# Patient Record
Sex: MALE | URBAN - METROPOLITAN AREA
[De-identification: names, ages, dates, MRNs, and addresses within clinical notes are randomized per-mention and may not be internally consistent; named-entity substitution may affect disease eponyms.]

---

## 2022-10-04 ENCOUNTER — OFFICE (OUTPATIENT)
Dept: URBAN - METROPOLITAN AREA CLINIC 105 | Facility: CLINIC | Age: 27
Setting detail: OPHTHALMOLOGY
End: 2022-10-04
Payer: COMMERCIAL

## 2022-10-04 DIAGNOSIS — H40.013: ICD-10-CM

## 2022-10-04 DIAGNOSIS — H52.31: ICD-10-CM

## 2022-10-04 PROCEDURE — 92015 DETERMINE REFRACTIVE STATE: CPT | Performed by: OPTOMETRIST

## 2022-10-04 PROCEDURE — 92004 COMPRE OPH EXAM NEW PT 1/>: CPT | Performed by: OPTOMETRIST

## 2022-10-04 ASSESSMENT — KERATOMETRY
OS_K2POWER_DIOPTERS: 40.00
OS_K1POWER_DIOPTERS: 39.25
OD_K2POWER_DIOPTERS: 40.50
OS_AXISANGLE_DEGREES: 065
OD_AXISANGLE_DEGREES: 085
OD_K1POWER_DIOPTERS: 39.00

## 2022-10-04 ASSESSMENT — TONOMETRY
OS_IOP_MMHG: 19
OD_IOP_MMHG: 19

## 2022-10-04 ASSESSMENT — REFRACTION_MANIFEST
OS_VA1: 20/20
OD_VA1: 20/20
OD_SPHERE: +1.75
OS_VA1: 20/20
OD_CYLINDER: -1.25
OD_CYLINDER: -1.25
OS_SPHERE: -0.25
OD_AXIS: 160
OS_SPHERE: PLANO
OD_AXIS: 160
OD_VA1: 20/20
OD_SPHERE: +1.75
OS_CYLINDER: SPH
OS_CYLINDER: SPH

## 2022-10-04 ASSESSMENT — AXIALLENGTH_DERIVED
OD_AL: 24.5699
OD_AL: 24.5699
OD_AL: 24.7831
OS_AL: 25.2732

## 2022-10-04 ASSESSMENT — REFRACTION_AUTOREFRACTION
OD_SPHERE: +1.00
OD_CYLINDER: -0.75
OD_AXIS: 157
OS_CYLINDER: -0.25
OS_SPHERE: -0.25
OS_AXIS: 086

## 2022-10-04 ASSESSMENT — VISUAL ACUITY
OS_BCVA: 20/30+2
OD_BCVA: 20/20

## 2022-10-04 ASSESSMENT — SPHEQUIV_DERIVED
OS_SPHEQUIV: -0.375
OD_SPHEQUIV: 1.125
OD_SPHEQUIV: 0.625
OD_SPHEQUIV: 1.125

## 2022-10-04 ASSESSMENT — CONFRONTATIONAL VISUAL FIELD TEST (CVF)
OS_FINDINGS: FULL
OD_FINDINGS: FULL

## 2022-10-25 ENCOUNTER — OFFICE (OUTPATIENT)
Dept: URBAN - METROPOLITAN AREA CLINIC 105 | Facility: CLINIC | Age: 27
Setting detail: OPHTHALMOLOGY
End: 2022-10-25
Payer: COMMERCIAL

## 2022-10-25 DIAGNOSIS — H53.32: ICD-10-CM

## 2022-10-25 PROCEDURE — 99213 OFFICE O/P EST LOW 20 MIN: CPT | Performed by: OPTOMETRIST

## 2022-10-25 ASSESSMENT — AXIALLENGTH_DERIVED
OD_AL: 24.5699
OS_AL: 25.2732
OD_AL: 24.7831
OD_AL: 24.5699

## 2022-10-25 ASSESSMENT — REFRACTION_CURRENTRX
OD_CYLINDER: -1.25
OD_SPHERE: +1.75
OD_OVR_VA: 20/
OS_CYLINDER: SPH
OS_SPHERE: PLANO
OD_AXIS: 160
OS_OVR_VA: 20/

## 2022-10-25 ASSESSMENT — REFRACTION_MANIFEST
OD_AXIS: 160
OD_CYLINDER: -1.25
OD_SPHERE: +1.75
OD_CYLINDER: -1.25
OD_SPHERE: +1.75
OS_CYLINDER: SPH
OS_VA1: 20/20
OD_VA1: 20/20
OS_CYLINDER: SPH
OD_AXIS: 160
OS_VA1: 20/20
OD_VA1: 20/20
OS_SPHERE: PLANO
OS_SPHERE: -0.25

## 2022-10-25 ASSESSMENT — VISUAL ACUITY
OS_BCVA: 20/20-2
OD_BCVA: 20/20

## 2022-10-25 ASSESSMENT — CONFRONTATIONAL VISUAL FIELD TEST (CVF)
OD_FINDINGS: FULL
OS_FINDINGS: FULL

## 2022-10-25 ASSESSMENT — REFRACTION_AUTOREFRACTION
OS_SPHERE: -0.25
OS_AXIS: 086
OS_CYLINDER: -0.25
OD_AXIS: 157
OD_SPHERE: +1.00
OD_CYLINDER: -0.75

## 2022-10-25 ASSESSMENT — SPHEQUIV_DERIVED
OD_SPHEQUIV: 1.125
OS_SPHEQUIV: -0.375
OD_SPHEQUIV: 0.625
OD_SPHEQUIV: 1.125

## 2022-10-25 ASSESSMENT — KERATOMETRY
OS_K2POWER_DIOPTERS: 40.00
OD_K2POWER_DIOPTERS: 40.50
OS_AXISANGLE_DEGREES: 065
OD_AXISANGLE_DEGREES: 085
OS_K1POWER_DIOPTERS: 39.25
OD_K1POWER_DIOPTERS: 39.00

## 2022-11-29 ENCOUNTER — OFFICE (OUTPATIENT)
Dept: URBAN - METROPOLITAN AREA CLINIC 113 | Facility: CLINIC | Age: 27
Setting detail: OPHTHALMOLOGY
End: 2022-11-29
Payer: COMMERCIAL

## 2022-11-29 DIAGNOSIS — H52.31: ICD-10-CM

## 2022-11-29 PROCEDURE — 92310 CONTACT LENS FITTING OU: CPT | Performed by: OPTOMETRIST

## 2022-11-29 ASSESSMENT — REFRACTION_AUTOREFRACTION
OS_SPHERE: -0.25
OD_AXIS: 161
OD_SPHERE: +1.25
OD_CYLINDER: -1.00
OS_CYLINDER: -0.25
OS_AXIS: 085

## 2022-11-29 ASSESSMENT — REFRACTION_MANIFEST
OD_AXIS: 160
OD_VA1: 20/20
OS_VA1: 20/20
OS_SPHERE: -0.25
OS_CYLINDER: SPH
OD_AXIS: 160
OD_SPHERE: +1.75
OS_VA1: 20/20
OD_CYLINDER: -1.25
OD_VA1: 20/20
OS_CYLINDER: SPH
OD_SPHERE: +1.75
OS_SPHERE: PLANO
OD_CYLINDER: -1.25

## 2022-11-29 ASSESSMENT — AXIALLENGTH_DERIVED
OS_AL: 25.1682
OD_AL: 24.78
OD_AL: 24.6198
OD_AL: 24.6198

## 2022-11-29 ASSESSMENT — SPHEQUIV_DERIVED
OD_SPHEQUIV: 1.125
OS_SPHEQUIV: -0.375
OD_SPHEQUIV: 0.75
OD_SPHEQUIV: 1.125

## 2022-11-29 ASSESSMENT — CONFRONTATIONAL VISUAL FIELD TEST (CVF)
OD_FINDINGS: FULL
OS_FINDINGS: FULL

## 2022-11-29 ASSESSMENT — REFRACTION_CURRENTRX
OD_CYLINDER: -1.25
OS_CYLINDER: SPH
OD_OVR_VA: 20/
OS_SPHERE: PLANO
OS_OVR_VA: 20/
OD_SPHERE: +1.75
OD_AXIS: 160

## 2022-11-29 ASSESSMENT — KERATOMETRY
OD_K2POWER_DIOPTERS: 40.25
OS_K1POWER_DIOPTERS: 39.50
OD_AXISANGLE_DEGREES: 084
OD_K1POWER_DIOPTERS: 39.00
OS_K2POWER_DIOPTERS: 40.25
OS_AXISANGLE_DEGREES: 080

## 2022-11-29 ASSESSMENT — VISUAL ACUITY
OS_BCVA: 20/25+1
OD_BCVA: 20/20

## 2022-11-29 ASSESSMENT — TONOMETRY
OS_IOP_MMHG: 17
OD_IOP_MMHG: 16

## 2022-12-06 ENCOUNTER — OFFICE (OUTPATIENT)
Dept: URBAN - METROPOLITAN AREA CLINIC 113 | Facility: CLINIC | Age: 27
Setting detail: OPHTHALMOLOGY
End: 2022-12-06
Payer: COMMERCIAL

## 2022-12-06 DIAGNOSIS — T15.11XD: ICD-10-CM

## 2022-12-06 DIAGNOSIS — T15.11XS: ICD-10-CM

## 2022-12-06 DIAGNOSIS — T15.12XD: ICD-10-CM

## 2022-12-06 DIAGNOSIS — T15.12XA: ICD-10-CM

## 2022-12-06 DIAGNOSIS — H40.013: ICD-10-CM

## 2022-12-06 DIAGNOSIS — T15.12XS: ICD-10-CM

## 2022-12-06 DIAGNOSIS — T15.11XA: ICD-10-CM

## 2022-12-06 PROBLEM — H53.32 FUSION WITH DEFECTIVE STEREOPSIS: Status: ACTIVE | Noted: 2022-10-25

## 2022-12-06 PROBLEM — H52.31 ANISOMETROPIA: Status: ACTIVE | Noted: 2022-10-04

## 2022-12-06 PROCEDURE — 99213 OFFICE O/P EST LOW 20 MIN: CPT | Performed by: STUDENT IN AN ORGANIZED HEALTH CARE EDUCATION/TRAINING PROGRAM

## 2022-12-06 ASSESSMENT — AXIALLENGTH_DERIVED
OD_AL: 24.5699
OS_AL: 24.7831
OD_AL: 24.5699
OD_AL: 24.3605

## 2022-12-06 ASSESSMENT — REFRACTION_MANIFEST
OD_AXIS: 160
OS_VA1: 20/20
OS_CYLINDER: SPH
OD_SPHERE: +1.75
OD_SPHERE: +1.75
OD_VA1: 20/20
OS_SPHERE: PLANO
OD_VA1: 20/20
OD_CYLINDER: -1.25
OD_CYLINDER: -1.25
OS_VA1: 20/20
OS_CYLINDER: SPH
OD_AXIS: 160
OS_SPHERE: -0.25

## 2022-12-06 ASSESSMENT — REFRACTION_CURRENTRX
OD_CYLINDER: -1.25
OS_CYLINDER: SPH
OS_OVR_VA: 20/
OD_SPHERE: +1.75
OD_AXIS: 160
OS_SPHERE: PLANO
OD_OVR_VA: 20/

## 2022-12-06 ASSESSMENT — CONFRONTATIONAL VISUAL FIELD TEST (CVF)
OD_FINDINGS: FULL
OS_FINDINGS: FULL

## 2022-12-06 ASSESSMENT — SPHEQUIV_DERIVED
OD_SPHEQUIV: 1.125
OD_SPHEQUIV: 1.625
OS_SPHEQUIV: 0.625
OD_SPHEQUIV: 1.125

## 2022-12-06 ASSESSMENT — REFRACTION_AUTOREFRACTION
OS_CYLINDER: -0.25
OD_CYLINDER: -0.75
OD_SPHERE: +2.00
OS_AXIS: 099
OS_SPHERE: +0.75
OD_AXIS: 161

## 2022-12-06 ASSESSMENT — KERATOMETRY
OS_K1POWER_DIOPTERS: 39.50
OD_K1POWER_DIOPTERS: 39.00
OS_K2POWER_DIOPTERS: 40.00
OD_K2POWER_DIOPTERS: 40.50
OS_AXISANGLE_DEGREES: 076
OD_AXISANGLE_DEGREES: 083

## 2022-12-06 ASSESSMENT — VISUAL ACUITY
OD_BCVA: 20/20
OS_BCVA: 20/25+1

## 2022-12-10 ENCOUNTER — OFFICE (OUTPATIENT)
Dept: URBAN - METROPOLITAN AREA CLINIC 12 | Facility: CLINIC | Age: 27
Setting detail: OPHTHALMOLOGY
End: 2022-12-10
Payer: COMMERCIAL

## 2022-12-10 DIAGNOSIS — H52.13: ICD-10-CM

## 2022-12-10 PROBLEM — T15.12XA FOREIGN BODY, CONJUNCTIVAL ; RIGHT EYE: Status: RESOLVED | Noted: 2022-12-06 | Resolved: 2022-12-10

## 2022-12-10 PROBLEM — T15.11XS FOREIGN BODY, CONJUNCTIVAL ; RIGHT EYE: Status: RESOLVED | Noted: 2022-12-06 | Resolved: 2022-12-10

## 2022-12-10 PROBLEM — T15.12XS FOREIGN BODY, CONJUNCTIVAL ; RIGHT EYE: Status: RESOLVED | Noted: 2022-12-06 | Resolved: 2022-12-10

## 2022-12-10 PROBLEM — T15.11XD FOREIGN BODY, CONJUNCTIVAL ; RIGHT EYE: Status: RESOLVED | Noted: 2022-12-06 | Resolved: 2022-12-10

## 2022-12-10 PROBLEM — T15.12XD FOREIGN BODY, CONJUNCTIVAL ; RIGHT EYE: Status: RESOLVED | Noted: 2022-12-06 | Resolved: 2022-12-10

## 2022-12-10 PROBLEM — T15.11XA FOREIGN BODY, CONJUNCTIVAL ; RIGHT EYE: Status: RESOLVED | Noted: 2022-12-06 | Resolved: 2022-12-10

## 2022-12-10 PROCEDURE — SCREF LASIK EVAL: Performed by: OPHTHALMOLOGY

## 2022-12-10 ASSESSMENT — REFRACTION_AUTOREFRACTION
OD_SPHERE: +1.75
OD_AXIS: 164
OD_CYLINDER: -0.75
OS_AXIS: 089
OS_CYLINDER: -0.25
OS_SPHERE: +0.75

## 2022-12-10 ASSESSMENT — REFRACTION_MANIFEST
OS_SPHERE: PLANO
OD_VA1: 20/20
OS_CYLINDER: -0.25
OD_CYLINDER: -0.75
OS_SPHERE: +0.75
OS_VA1: 20/20
OD_VA1: 20/25+1
OD_AXIS: 160
OD_CYLINDER: -1.25
OS_CYLINDER: SPH
OD_SPHERE: +1.75
OD_AXIS: 164
OD_SPHERE: +1.75
OS_AXIS: 089

## 2022-12-10 ASSESSMENT — AXIALLENGTH_DERIVED
OD_AL: 24.5699
OD_AL: 24.4647
OS_AL: 24.7831
OS_AL: 24.7831
OD_AL: 24.4647

## 2022-12-10 ASSESSMENT — KERATOMETRY
OD_K2POWER_DIOPTERS: 40.50
OD_AXISANGLE_DEGREES: 085
OS_K2POWER_DIOPTERS: 40.00
OS_K1POWER_DIOPTERS: 39.50
OD_K1POWER_DIOPTERS: 39.00
OS_AXISANGLE_DEGREES: 078

## 2022-12-10 ASSESSMENT — REFRACTION_CURRENTRX
OD_SPHERE: +1.75
OD_AXIS: 162
OD_CYLINDER: -1.25
OD_VPRISM_DIRECTION: SV
OD_OVR_VA: 20/
OS_OVR_VA: 20/
OS_SPHERE: PLANO
OS_VPRISM_DIRECTION: SV
OS_CYLINDER: SPH

## 2022-12-10 ASSESSMENT — VISUAL ACUITY
OS_BCVA: 20/25-2
OD_BCVA: 20/20

## 2022-12-10 ASSESSMENT — TONOMETRY
OS_IOP_MMHG: 20
OD_IOP_MMHG: 19

## 2022-12-10 ASSESSMENT — SPHEQUIV_DERIVED
OD_SPHEQUIV: 1.375
OS_SPHEQUIV: 0.625
OD_SPHEQUIV: 1.125
OD_SPHEQUIV: 1.375
OS_SPHEQUIV: 0.625

## 2022-12-10 ASSESSMENT — CONFRONTATIONAL VISUAL FIELD TEST (CVF)
OD_FINDINGS: FULL
OS_FINDINGS: FULL

## 2023-01-10 ENCOUNTER — APPOINTMENT (OUTPATIENT)
Dept: OPHTHALMOLOGY | Facility: CLINIC | Age: 28
End: 2023-01-10

## 2023-01-23 ENCOUNTER — OFFICE (OUTPATIENT)
Dept: URBAN - METROPOLITAN AREA CLINIC 12 | Facility: CLINIC | Age: 28
Setting detail: OPHTHALMOLOGY
End: 2023-01-23
Payer: COMMERCIAL

## 2023-01-23 DIAGNOSIS — Z20.822: ICD-10-CM

## 2023-01-23 DIAGNOSIS — H52.13: ICD-10-CM

## 2023-01-23 DIAGNOSIS — Z01.812: ICD-10-CM

## 2023-01-23 PROCEDURE — SCREF LASIK EVAL: Performed by: OPHTHALMOLOGY

## 2023-01-23 PROCEDURE — N/C NO CHARGE: Performed by: OPHTHALMOLOGY

## 2023-01-23 ASSESSMENT — REFRACTION_MANIFEST
OD_CYLINDER: -0.75
OS_SPHERE: +0.75
OD_VA1: 20/20
OD_AXIS: 164
OS_SPHERE: +0.75
OD_AXIS: 160
OS_CYLINDER: -0.25
OS_AXIS: 089
OD_VA1: 20/25+1
OS_SPHERE: PLANO
OD_SPHERE: +1.75
OD_AXIS: 164
OD_AXIS: 164
OD_VA1: 20/25+1
OD_VA1: 20/20-
OS_CYLINDER: SPH
OS_AXIS: 089
OD_SPHERE: +2.00
OS_CYLINDER: -0.25
OD_SPHERE: +1.75
OS_VA1: 20/20
OD_SPHERE: +1.75
OD_CYLINDER: -0.75
OD_CYLINDER: -0.75
OD_CYLINDER: -1.25

## 2023-01-23 ASSESSMENT — SPHEQUIV_DERIVED
OS_SPHEQUIV: 0.625
OS_SPHEQUIV: 0.625
OD_SPHEQUIV: 1.375
OS_SPHEQUIV: 0.625
OD_SPHEQUIV: 1.375
OD_SPHEQUIV: 1.125
OD_SPHEQUIV: 1.625
OD_SPHEQUIV: 1.375
OS_SPHEQUIV: 0.375
OD_SPHEQUIV: 1.375

## 2023-01-23 ASSESSMENT — KERATOMETRY
OS_K2POWER_DIOPTERS: 40.00
OD_K2POWER_DIOPTERS: 40.50
OD_K1POWER_DIOPTERS: 39.25
OD_AXISANGLE_DEGREES: 88
OS_K1POWER_DIOPTERS: 39.50
OS_AXISANGLE_DEGREES: 078
OS_K2POWER_DIOPTERS: 40.00
OS_AXISANGLE_DEGREES: 83
OD_AXISANGLE_DEGREES: 085
OS_K1POWER_DIOPTERS: 39.50
OD_K2POWER_DIOPTERS: 40.50
OD_K1POWER_DIOPTERS: 39.00

## 2023-01-23 ASSESSMENT — CONFRONTATIONAL VISUAL FIELD TEST (CVF)
OS_FINDINGS: FULL
OD_FINDINGS: FULL

## 2023-01-23 ASSESSMENT — REFRACTION_CURRENTRX
OS_AXIS: 0
OS_OVR_VA: 20/
OD_CYLINDER: -1.25
OS_VPRISM_DIRECTION: SV
OD_OVR_VA: 20/
OS_SPHERE: PLANO
OD_SPHERE: +1.75
OD_VPRISM_DIRECTION: SV
OD_CYLINDER: -1.25
OD_AXIS: 163
OD_AXIS: 162
OD_SPHERE: +1.75
OS_OVR_VA: 20/
OS_SPHERE: PLANO
OS_CYLINDER: SPH
OD_OVR_VA: 20/
OS_CYLINDER: SPHERE
OS_VPRISM_DIRECTION: SV
OD_VPRISM_DIRECTION: SV

## 2023-01-23 ASSESSMENT — REFRACTION_AUTOREFRACTION
OS_AXIS: 089
OD_SPHERE: +1.75
OS_SPHERE: +0.75
OS_SPHERE: +0.50
OD_AXIS: 161
OS_AXIS: 75
OD_CYLINDER: -0.75
OD_CYLINDER: -0.75
OS_CYLINDER: -0.25
OS_CYLINDER: -0.25
OD_SPHERE: +1.75
OD_AXIS: 164

## 2023-01-23 ASSESSMENT — AXIALLENGTH_DERIVED
OD_AL: 24.3116
OD_AL: 24.4155
OD_AL: 24.4647
OD_AL: 24.4155
OD_AL: 24.4647
OS_AL: 24.7831
OS_AL: 24.891
OD_AL: 24.5699
OS_AL: 24.7831
OS_AL: 24.7831

## 2023-01-23 ASSESSMENT — VISUAL ACUITY
OD_BCVA: 20/20-2
OS_BCVA: 20/25-2
OS_BCVA: 20/40-2
OD_BCVA: 20/20

## 2023-01-25 ENCOUNTER — OTHER LOCATION (OUTPATIENT)
Dept: URBAN - METROPOLITAN AREA LASIK CENTER 6 | Facility: LASIK CENTER | Age: 28
Setting detail: OPHTHALMOLOGY
End: 2023-01-25

## 2023-01-25 ENCOUNTER — RX ONLY (RX ONLY)
Age: 28
End: 2023-01-25

## 2023-01-25 DIAGNOSIS — H52.11: ICD-10-CM

## 2023-01-25 PROCEDURE — 65760 KERATOMILEUSIS: CPT | Performed by: OPHTHALMOLOGY

## 2023-01-26 ENCOUNTER — OFFICE (OUTPATIENT)
Dept: URBAN - METROPOLITAN AREA CLINIC 12 | Facility: CLINIC | Age: 28
Setting detail: OPHTHALMOLOGY
End: 2023-01-26
Payer: COMMERCIAL

## 2023-01-26 DIAGNOSIS — H52.11: ICD-10-CM

## 2023-01-26 PROCEDURE — 99024 POSTOP FOLLOW-UP VISIT: CPT | Performed by: OPHTHALMOLOGY

## 2023-01-26 ASSESSMENT — SPHEQUIV_DERIVED
OD_SPHEQUIV: 1.625
OD_SPHEQUIV: -1.625
OD_SPHEQUIV: 1.375
OS_SPHEQUIV: 0.125
OS_SPHEQUIV: 0.625

## 2023-01-26 ASSESSMENT — REFRACTION_MANIFEST
OD_VA1: 20/25+1
OD_SPHERE: +1.75
OD_CYLINDER: -0.75
OD_SPHERE: +2.00
OS_SPHERE: +0.75
OS_CYLINDER: -0.25
OD_AXIS: 164
OS_AXIS: 089
OD_AXIS: 164
OD_CYLINDER: -0.75
OD_VA1: 20/20-

## 2023-01-26 ASSESSMENT — KERATOMETRY
OS_K1POWER_DIOPTERS: 39.50
OD_K1POWER_DIOPTERS: 41.50
OD_K2POWER_DIOPTERS: 42.75
OD_AXISANGLE_DEGREES: 099
OS_K2POWER_DIOPTERS: 40.25
OS_AXISANGLE_DEGREES: 082

## 2023-01-26 ASSESSMENT — REFRACTION_CURRENTRX
OS_AXIS: 0
OS_VPRISM_DIRECTION: SV
OS_OVR_VA: 20/
OD_OVR_VA: 20/
OD_CYLINDER: -1.25
OD_VPRISM_DIRECTION: SV
OD_SPHERE: +1.75
OD_AXIS: 163
OS_SPHERE: PLANO
OS_CYLINDER: SPHERE

## 2023-01-26 ASSESSMENT — REFRACTION_AUTOREFRACTION
OD_SPHERE: -1.25
OS_CYLINDER: -0.25
OD_AXIS: 021
OS_AXIS: 048
OS_SPHERE: +0.25
OD_CYLINDER: -0.75

## 2023-01-26 ASSESSMENT — CONFRONTATIONAL VISUAL FIELD TEST (CVF)
OD_FINDINGS: FULL
OS_FINDINGS: FULL

## 2023-01-26 ASSESSMENT — AXIALLENGTH_DERIVED
OD_AL: 23.4645
OS_AL: 24.9485
OS_AL: 24.7325
OD_AL: 23.5613
OD_AL: 24.7878

## 2023-01-26 ASSESSMENT — VISUAL ACUITY
OD_BCVA: 20/20-
OS_BCVA: 20/125

## 2023-01-30 ENCOUNTER — OFFICE (OUTPATIENT)
Dept: URBAN - METROPOLITAN AREA CLINIC 12 | Facility: CLINIC | Age: 28
Setting detail: OPHTHALMOLOGY
End: 2023-01-30
Payer: COMMERCIAL

## 2023-01-30 DIAGNOSIS — H52.11: ICD-10-CM

## 2023-01-30 PROCEDURE — 99024 POSTOP FOLLOW-UP VISIT: CPT | Performed by: OPHTHALMOLOGY

## 2023-01-30 ASSESSMENT — REFRACTION_CURRENTRX
OD_SPHERE: +1.75
OD_CYLINDER: -1.25
OD_AXIS: 163
OS_AXIS: 0
OS_SPHERE: PLANO
OS_CYLINDER: SPHERE
OS_VPRISM_DIRECTION: SV
OD_VPRISM_DIRECTION: SV
OD_OVR_VA: 20/
OS_OVR_VA: 20/

## 2023-01-30 ASSESSMENT — REFRACTION_AUTOREFRACTION
OD_CYLINDER: -1.25
OD_SPHERE: -2.25
OS_AXIS: 080
OD_AXIS: 017
OS_SPHERE: +0.50
OS_CYLINDER: -0.25

## 2023-01-30 ASSESSMENT — REFRACTION_MANIFEST
OD_AXIS: 164
OS_SPHERE: +0.75
OD_SPHERE: +1.75
OS_CYLINDER: -0.25
OS_AXIS: 089
OD_CYLINDER: -0.75
OD_AXIS: 164
OD_VA1: 20/20-
OD_SPHERE: +2.00
OD_VA1: 20/25+1
OD_CYLINDER: -0.75

## 2023-01-30 ASSESSMENT — VISUAL ACUITY
OS_BCVA: 20/60-1
OD_BCVA: 20/20

## 2023-01-30 ASSESSMENT — KERATOMETRY
OD_K1POWER_DIOPTERS: 41.50
OS_K1POWER_DIOPTERS: 39.50
OD_AXISANGLE_DEGREES: 102
OS_AXISANGLE_DEGREES: 84
OS_K2POWER_DIOPTERS: 40.00
OD_K2POWER_DIOPTERS: 43.75

## 2023-01-30 ASSESSMENT — AXIALLENGTH_DERIVED
OD_AL: 23.2843
OD_AL: 23.3795
OS_AL: 24.891
OS_AL: 24.7831
OD_AL: 25.1272

## 2023-01-30 ASSESSMENT — CONFRONTATIONAL VISUAL FIELD TEST (CVF)
OS_FINDINGS: FULL
OD_FINDINGS: FULL

## 2023-01-30 ASSESSMENT — SPHEQUIV_DERIVED
OS_SPHEQUIV: 0.625
OD_SPHEQUIV: -2.875
OD_SPHEQUIV: 1.375
OS_SPHEQUIV: 0.375
OD_SPHEQUIV: 1.625

## 2023-02-20 ENCOUNTER — OFFICE (OUTPATIENT)
Dept: URBAN - METROPOLITAN AREA CLINIC 12 | Facility: CLINIC | Age: 28
Setting detail: OPHTHALMOLOGY
End: 2023-02-20
Payer: COMMERCIAL

## 2023-02-20 DIAGNOSIS — H52.11: ICD-10-CM

## 2023-02-20 PROCEDURE — 99024 POSTOP FOLLOW-UP VISIT: CPT | Performed by: OPHTHALMOLOGY

## 2023-02-20 ASSESSMENT — REFRACTION_MANIFEST
OD_CYLINDER: -0.50
OS_CYLINDER: -0.25
OD_VA1: 20/30
OS_SPHERE: +0.75
OD_AXIS: 050
OD_SPHERE: +1.75
OD_SPHERE: -1.50
OD_CYLINDER: -0.75
OD_AXIS: 164
OS_AXIS: 089
OD_VA1: 20/25+1

## 2023-02-20 ASSESSMENT — AXIALLENGTH_DERIVED
OD_AL: 25.2557
OD_AL: 24.8169
OD_AL: 23.9334
OS_AL: 24.7831
OS_AL: 24.891

## 2023-02-20 ASSESSMENT — SPHEQUIV_DERIVED
OS_SPHEQUIV: 0.375
OD_SPHEQUIV: -1.75
OD_SPHEQUIV: 1.375
OD_SPHEQUIV: -0.75
OS_SPHEQUIV: 0.625

## 2023-02-20 ASSESSMENT — KERATOMETRY
OS_AXISANGLE_DEGREES: 076
OS_K1POWER_DIOPTERS: 39.50
OD_K2POWER_DIOPTERS: 41.25
OD_K1POWER_DIOPTERS: 41.00
OS_K2POWER_DIOPTERS: 40.00
OD_AXISANGLE_DEGREES: 115

## 2023-02-20 ASSESSMENT — REFRACTION_CURRENTRX
OS_SPHERE: PLANO
OS_AXIS: 0
OD_VPRISM_DIRECTION: SV
OD_AXIS: 163
OD_SPHERE: +1.75
OD_OVR_VA: 20/
OS_VPRISM_DIRECTION: SV
OD_CYLINDER: -1.25
OS_CYLINDER: SPHERE
OS_OVR_VA: 20/

## 2023-02-20 ASSESSMENT — TONOMETRY
OD_IOP_MMHG: 21
OS_IOP_MMHG: 20

## 2023-02-20 ASSESSMENT — REFRACTION_AUTOREFRACTION
OS_AXIS: 092
OD_AXIS: 050
OS_CYLINDER: -0.25
OD_SPHERE: -0.50
OD_CYLINDER: -0.50
OS_SPHERE: +0.50

## 2023-02-20 ASSESSMENT — VISUAL ACUITY
OD_BCVA: 20/20
OS_BCVA: 20/70

## 2023-02-20 ASSESSMENT — CONFRONTATIONAL VISUAL FIELD TEST (CVF)
OS_FINDINGS: FULL
OD_FINDINGS: FULL

## 2023-04-10 ENCOUNTER — OFFICE (OUTPATIENT)
Dept: URBAN - METROPOLITAN AREA CLINIC 12 | Facility: CLINIC | Age: 28
Setting detail: OPHTHALMOLOGY
End: 2023-04-10
Payer: COMMERCIAL

## 2023-04-10 DIAGNOSIS — H52.11: ICD-10-CM

## 2023-04-10 PROCEDURE — 99024 POSTOP FOLLOW-UP VISIT: CPT | Performed by: OPHTHALMOLOGY

## 2023-04-10 ASSESSMENT — VISUAL ACUITY
OS_BCVA: 20/60-1
OD_BCVA: 20/20

## 2023-04-10 ASSESSMENT — REFRACTION_AUTOREFRACTION
OS_SPHERE: PLANO
OD_CYLINDER: -0.50
OD_SPHERE: PLANO
OD_AXIS: 003
OS_CYLINDER: SPHERE
OS_AXIS: 000

## 2023-04-10 ASSESSMENT — REFRACTION_CURRENTRX
OS_CYLINDER: SPHERE
OS_SPHERE: PLANO
OS_OVR_VA: 20/
OD_SPHERE: +1.75
OS_AXIS: 0
OD_AXIS: 163
OD_CYLINDER: -1.25
OD_VPRISM_DIRECTION: SV
OD_OVR_VA: 20/
OS_VPRISM_DIRECTION: SV

## 2023-04-10 ASSESSMENT — KERATOMETRY
OS_K2POWER_DIOPTERS: 40.00
OS_AXISANGLE_DEGREES: 083
OS_K1POWER_DIOPTERS: 39.50
OD_AXISANGLE_DEGREES: 095
OD_K2POWER_DIOPTERS: 41.75
OD_K1POWER_DIOPTERS: 40.50

## 2023-04-10 ASSESSMENT — TONOMETRY
OD_IOP_MMHG: 20
OS_IOP_MMHG: 19

## 2023-04-10 ASSESSMENT — REFRACTION_MANIFEST
OD_SPHERE: -1.50
OD_CYLINDER: -0.50
OD_VA1: 20/20-
OD_AXIS: 180
OD_SPHERE: -1.00
OD_VA1: 20/30
OD_CYLINDER: -0.50
OD_AXIS: 050

## 2023-04-10 ASSESSMENT — SPHEQUIV_DERIVED
OD_SPHEQUIV: -1.75
OD_SPHEQUIV: -1.25

## 2023-04-10 ASSESSMENT — CONFRONTATIONAL VISUAL FIELD TEST (CVF)
OS_FINDINGS: FULL
OD_FINDINGS: FULL

## 2023-04-10 ASSESSMENT — AXIALLENGTH_DERIVED
OD_AL: 25.0344
OD_AL: 25.2557

## 2023-07-10 ENCOUNTER — OFFICE (OUTPATIENT)
Dept: URBAN - METROPOLITAN AREA CLINIC 12 | Facility: CLINIC | Age: 28
Setting detail: OPHTHALMOLOGY
End: 2023-07-10
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
## 2023-07-10 PROCEDURE — NO SHOW FE NO SHOW FEE: Performed by: OPHTHALMOLOGY

## 2023-07-13 ENCOUNTER — OFFICE (OUTPATIENT)
Dept: URBAN - METROPOLITAN AREA CLINIC 12 | Facility: CLINIC | Age: 28
Setting detail: OPHTHALMOLOGY
End: 2023-07-13
Payer: COMMERCIAL

## 2023-07-13 DIAGNOSIS — H40.013: ICD-10-CM

## 2023-07-13 DIAGNOSIS — H52.11: ICD-10-CM

## 2023-07-13 PROCEDURE — 99024 POSTOP FOLLOW-UP VISIT: CPT | Performed by: OPHTHALMOLOGY

## 2023-07-13 ASSESSMENT — TONOMETRY
OD_IOP_MMHG: 15
OS_IOP_MMHG: 14

## 2023-07-13 ASSESSMENT — REFRACTION_CURRENTRX
OD_OVR_VA: 20/
OD_SPHERE: +1.75
OD_CYLINDER: -1.25
OS_VPRISM_DIRECTION: SV
OS_AXIS: 0
OD_AXIS: 163
OS_CYLINDER: SPHERE
OS_OVR_VA: 20/
OS_SPHERE: PLANO
OD_VPRISM_DIRECTION: SV

## 2023-07-13 ASSESSMENT — REFRACTION_MANIFEST
OD_SPHERE: -1.00
OD_CYLINDER: -0.50
OD_AXIS: 050
OD_AXIS: 180
OD_CYLINDER: -0.50
OD_VA1: 20/30
OD_VA1: 20/20-
OD_SPHERE: -1.50

## 2023-07-13 ASSESSMENT — SPHEQUIV_DERIVED
OD_SPHEQUIV: -1.25
OD_SPHEQUIV: -1.75
OS_SPHEQUIV: 0.125
OD_SPHEQUIV: 0.125

## 2023-07-13 ASSESSMENT — REFRACTION_AUTOREFRACTION
OS_AXIS: 080
OS_CYLINDER: -0.25
OD_AXIS: 005
OD_CYLINDER: -0.25
OD_SPHERE: +0.25
OS_SPHERE: +0.25

## 2023-07-13 ASSESSMENT — KERATOMETRY
OD_K2POWER_DIOPTERS: 41.25
OD_AXISANGLE_DEGREES: 099
OS_K2POWER_DIOPTERS: 40.00
OD_K1POWER_DIOPTERS: 40.50
OS_AXISANGLE_DEGREES: 088
OS_K1POWER_DIOPTERS: 39.50

## 2023-07-13 ASSESSMENT — VISUAL ACUITY
OS_BCVA: 20/25+2
OD_BCVA: 20/20

## 2023-07-13 ASSESSMENT — AXIALLENGTH_DERIVED
OS_AL: 24.9999
OD_AL: 24.5443
OD_AL: 25.3613
OD_AL: 25.1382

## 2023-07-13 ASSESSMENT — CONFRONTATIONAL VISUAL FIELD TEST (CVF)
OS_FINDINGS: FULL
OD_FINDINGS: FULL

## 2024-01-18 ENCOUNTER — OFFICE (OUTPATIENT)
Dept: URBAN - METROPOLITAN AREA CLINIC 12 | Facility: CLINIC | Age: 29
Setting detail: OPHTHALMOLOGY
End: 2024-01-18
Payer: COMMERCIAL

## 2024-01-18 DIAGNOSIS — H40.013: ICD-10-CM

## 2024-01-18 PROCEDURE — 92083 EXTENDED VISUAL FIELD XM: CPT | Performed by: STUDENT IN AN ORGANIZED HEALTH CARE EDUCATION/TRAINING PROGRAM

## 2024-01-18 PROCEDURE — 99213 OFFICE O/P EST LOW 20 MIN: CPT | Performed by: STUDENT IN AN ORGANIZED HEALTH CARE EDUCATION/TRAINING PROGRAM

## 2024-01-18 PROCEDURE — 92020 GONIOSCOPY: CPT | Performed by: STUDENT IN AN ORGANIZED HEALTH CARE EDUCATION/TRAINING PROGRAM

## 2024-01-18 PROCEDURE — 92133 CPTRZD OPH DX IMG PST SGM ON: CPT | Performed by: STUDENT IN AN ORGANIZED HEALTH CARE EDUCATION/TRAINING PROGRAM

## 2024-01-18 ASSESSMENT — REFRACTION_CURRENTRX
OS_AXIS: 0
OD_CYLINDER: -1.25
OS_CYLINDER: SPHERE
OD_OVR_VA: 20/
OD_AXIS: 163
OS_SPHERE: PLANO
OS_VPRISM_DIRECTION: SV
OS_OVR_VA: 20/
OD_SPHERE: +1.75
OD_VPRISM_DIRECTION: SV

## 2024-01-18 ASSESSMENT — SPHEQUIV_DERIVED
OD_SPHEQUIV: 0.875
OD_SPHEQUIV: -1.75
OD_SPHEQUIV: -1.25
OS_SPHEQUIV: 0.625

## 2024-01-18 ASSESSMENT — CONFRONTATIONAL VISUAL FIELD TEST (CVF)
OS_FINDINGS: FULL
OD_FINDINGS: FULL

## 2024-01-18 ASSESSMENT — REFRACTION_MANIFEST
OD_SPHERE: -1.00
OD_VA1: 20/20-
OD_AXIS: 180
OD_CYLINDER: -0.50
OD_VA1: 20/30
OD_SPHERE: -1.50
OD_AXIS: 050
OD_CYLINDER: -0.50

## 2024-01-18 ASSESSMENT — REFRACTION_AUTOREFRACTION
OS_AXIS: 081
OD_SPHERE: +1.00
OS_CYLINDER: -0.25
OS_SPHERE: +0.75
OD_CYLINDER: -0.25
OD_AXIS: 159

## 2024-07-24 ENCOUNTER — OFFICE (OUTPATIENT)
Dept: URBAN - METROPOLITAN AREA CLINIC 12 | Facility: CLINIC | Age: 29
Setting detail: OPHTHALMOLOGY
End: 2024-07-24
Payer: COMMERCIAL

## 2024-07-24 DIAGNOSIS — H40.013: ICD-10-CM

## 2024-07-24 PROCEDURE — 92250 FUNDUS PHOTOGRAPHY W/I&R: CPT | Performed by: STUDENT IN AN ORGANIZED HEALTH CARE EDUCATION/TRAINING PROGRAM

## 2024-07-24 PROCEDURE — 92014 COMPRE OPH EXAM EST PT 1/>: CPT | Performed by: STUDENT IN AN ORGANIZED HEALTH CARE EDUCATION/TRAINING PROGRAM

## 2024-07-24 ASSESSMENT — CONFRONTATIONAL VISUAL FIELD TEST (CVF)
OS_FINDINGS: FULL
OD_FINDINGS: FULL

## 2024-07-29 ENCOUNTER — OFFICE (OUTPATIENT)
Dept: URBAN - METROPOLITAN AREA CLINIC 12 | Facility: CLINIC | Age: 29
Setting detail: OPHTHALMOLOGY
End: 2024-07-29
Payer: COMMERCIAL

## 2024-07-29 DIAGNOSIS — S05.02XA: ICD-10-CM

## 2024-07-29 PROCEDURE — 92012 INTRM OPH EXAM EST PATIENT: CPT | Performed by: STUDENT IN AN ORGANIZED HEALTH CARE EDUCATION/TRAINING PROGRAM

## 2024-07-29 ASSESSMENT — CONFRONTATIONAL VISUAL FIELD TEST (CVF)
OS_FINDINGS: FULL
OD_FINDINGS: FULL

## 2024-07-31 ENCOUNTER — OFFICE (OUTPATIENT)
Dept: URBAN - METROPOLITAN AREA CLINIC 12 | Facility: CLINIC | Age: 29
Setting detail: OPHTHALMOLOGY
End: 2024-07-31
Payer: COMMERCIAL

## 2024-07-31 DIAGNOSIS — S05.02XD: ICD-10-CM

## 2024-07-31 PROCEDURE — 99213 OFFICE O/P EST LOW 20 MIN: CPT | Performed by: STUDENT IN AN ORGANIZED HEALTH CARE EDUCATION/TRAINING PROGRAM

## 2024-07-31 ASSESSMENT — CONFRONTATIONAL VISUAL FIELD TEST (CVF)
OS_FINDINGS: FULL
OD_FINDINGS: FULL

## 2024-08-28 ENCOUNTER — OFFICE (OUTPATIENT)
Dept: URBAN - METROPOLITAN AREA CLINIC 12 | Facility: CLINIC | Age: 29
Setting detail: OPHTHALMOLOGY
End: 2024-08-28
Payer: COMMERCIAL

## 2024-08-28 DIAGNOSIS — S05.02XD: ICD-10-CM

## 2024-08-28 DIAGNOSIS — H16.221: ICD-10-CM

## 2024-08-28 PROCEDURE — 99213 OFFICE O/P EST LOW 20 MIN: CPT | Performed by: STUDENT IN AN ORGANIZED HEALTH CARE EDUCATION/TRAINING PROGRAM

## 2024-08-28 ASSESSMENT — CONFRONTATIONAL VISUAL FIELD TEST (CVF)
OD_FINDINGS: FULL
OS_FINDINGS: FULL

## 2024-11-21 ENCOUNTER — EMERGENCY (EMERGENCY)
Facility: HOSPITAL | Age: 29
LOS: 1 days | Discharge: HOME | End: 2024-11-21
Attending: STUDENT IN AN ORGANIZED HEALTH CARE EDUCATION/TRAINING PROGRAM
Payer: COMMERCIAL

## 2024-11-21 VITALS
TEMPERATURE: 98 F | WEIGHT: 217.38 LBS | HEART RATE: 68 BPM | DIASTOLIC BLOOD PRESSURE: 91 MMHG | RESPIRATION RATE: 16 BRPM | HEIGHT: 72 IN | SYSTOLIC BLOOD PRESSURE: 150 MMHG | OXYGEN SATURATION: 97 %

## 2024-11-21 PROCEDURE — 99284 EMERGENCY DEPT VISIT MOD MDM: CPT

## 2024-11-21 RX ORDER — IBUPROFEN 200 MG
1 TABLET ORAL
Qty: 20 | Refills: 0
Start: 2024-11-21 | End: 2024-11-25

## 2024-11-21 RX ORDER — METHOCARBAMOL 500 MG/1
2 TABLET ORAL
Qty: 32 | Refills: 0
Start: 2024-11-21 | End: 2024-11-24

## 2024-11-21 RX ORDER — LIDOCAINE HYDROCHLORIDE 40 MG/ML
1 SOLUTION TOPICAL
Qty: 5 | Refills: 0
Start: 2024-11-21 | End: 2024-11-25

## 2024-11-21 NOTE — ED PROVIDER NOTE - ADDITIONAL NOTES AND INSTRUCTIONS:
PT was evaluated At [facility] ED and was found to have a condition that warranted time of to rest and heal from WORK/SCHOOL.   Bennett Hough PA-C

## 2024-11-21 NOTE — ED PROVIDER NOTE - MUSCULOSKELETAL, MLM
Spine appears normal, range of motion is not limited, no muscle or joint tenderness Cervical spine: no midline ttp. strength intact, NAVDEEP, no palpable bony abnormalities.    ttp over the Rt SCM muscle and Trapizus muscle

## 2024-11-21 NOTE — ED PROVIDER NOTE - NSFOLLOWUPINSTRUCTIONS_ED_ALL_ED_FT
Patient education: Neck pain – ED discharge instructions (The Basics)  Written by the doctors and editors at [facility]  Please read the Disclaimer at the end of this page.    What are discharge instructions?    Discharge instructions are information about how to take care of yourself after getting medical care in the emergency department ("ED").    What should I know?    You came to the ED for neck pain.    The neck has many parts including bones, muscles, tendons, ligaments, and nerves. Vertebrae, the bones in the spine, start at the base of the skull and extend down the back of the neck. There are discs between the vertebrae to cushion the bones. Ligaments, muscles, and tendons help hold the spine in place and let you move your neck. The spinal cord starts at the base of the brain and extends down the back. It is protected by the vertebrae. Smaller nerves travel from the spinal cord to the muscles and skin.    Neck pain is usually caused by an injury to a ligament, tendon, muscle, or nerve. The doctors think your neck pain is likely not caused by something dangerous.    How do I care for myself at home?    Ask the doctor or nurse what you should do when you go home. Make sure you understand exactly what you need to do to care for yourself. Ask questions if there is anything you do not understand.    You should also do the following:    ?Call your regular doctor and tell them you were in the ED. Make a follow-up appointment if you were told to.    ?If you were given a neck brace or cushion, wear it as instructed. If the doctor told you to, start doing gentle neck stretches in a few days.    ?For the first 24 to 48 hours after an injury, ice can help with pain:    •Place an ice pack or a bag of frozen vegetables wrapped in a towel over the painful part. Never put ice right on the skin. Use ice every 1 to 2 hours for 10 to 15 minutes at a time.    ?After that, heat can help with pain:    •Put a heating pad on the painful part for no more than 20 minutes at a time. Never go to sleep with a heating pad on, since this can cause burns. You can also take a hot shower or bath.    ?Take non-prescription medicines to relieve pain and swelling, such as ibuprofen (sample brand names: Advil, Motrin), or naproxen (sample brand name: Aleve). Acetaminophen (sample brand name: Tylenol) can also help relieve pain.    ?Try to practice good posture to avoid putting strain on your neck.    •Sit up straight, and keep your shoulders back. Avoid sitting in the same position for too long, and avoid carrying heavy things.    •Avoid holding or positioning screens so you are looking down.    •When you sleep, try to keep your neck in line with the rest of your body.    When should I get emergency help?    ?Call for emergency help right away (in the [place] and [place], call 9-1-1) if:    •Your neck becomes stiff and hard to move, and you are feeling sick or develop a fever or chills.    ?Return to the ED if:    •You have new numbness or weakness in 1 of your arms.    When should I call the doctor?    Call for advice if:    ?You still have bad pain after taking pain medicines.    ?Your symptoms are getting worse and are so severe you cannot do normal activities (such as dress or eat).    ?Your hand or arm is swollen.    ?Your arm is numb or tingly.    ?You lose control of your bladder or bowels.    ?The pain does not get better after 1 week of home treatment.    ?You have new or worsening symptoms.

## 2024-11-21 NOTE — ED PROVIDER NOTE - ATTENDING APP SHARED VISIT CONTRIBUTION OF CARE
29-year-old male no significant past medical history presents to the ED with right-sided neck pain that started upon waking up this morning.  States pain is not present at rest only worse with movement.  No fevers chills no numbness tingling weakness no headache, no bowel bladder dysfunction no saddle anesthesia.  On exam patient states he gets relief with palpation to the right paraspinal area, no midline spinal tenderness to palpation, no focal motor or sensory deficits.  Likely muscular in etiology, possibly torticollis.  Muscle relaxant sent to pharmacy.  Return precautions discussed

## 2024-11-21 NOTE — ED PROVIDER NOTE - PATIENT PORTAL LINK FT
You can access the FollowMyHealth Patient Portal offered by [facility] by registering at the following website: [email or web address]. By joining [employer]’s FollowMyHealth portal, you will also be able to view your health information using other applications (apps) compatible with our system.

## 2024-11-21 NOTE — ED PROVIDER NOTE - OBJECTIVE STATEMENT
PT with no SPMHx presents to the ED with complaint of Rt sided neck pain that started this am upon waking up. Pt states that he has a dull pain that is non radiating constat, dull cramping pain made worse with movement improved by nothing. Pt dines fever, chills, HA, dizziness, ear pain, throat pain diff swallowing, CP, SOB, diff breathing, rash.

## 2024-11-21 NOTE — ED PROVIDER NOTE - CARE PROVIDER_API CALL
J Carlos Ho  Neurosurgery  [address], [address]  [place] [address]  Phone: [phone]  Fax: [phone]  Follow Up Time:

## 2024-11-21 NOTE — ED PROVIDER NOTE - CLINICAL SUMMARY MEDICAL DECISION MAKING FREE TEXT BOX
PT with no SPMHx presents to the ED with complaint of Rt sided neck pain that started this am upon waking up. Pt states that he has a dull pain that is non radiating constat, dull cramping pain made worse with movement improved by nothing. Pt dines fever, chills, HA, dizziness, ear pain, throat pain diff swallowing, CP, SOB, diff breathing, rash. On exam Pt with ttp over the Rt SCM muscle and Trapizus muscle with no other acute findings, pt neuro vasc intact, no s/s of acute infection, Pt to be dc home with trial of PO meds, follow up to spinal team PRN, PCP, Pt educated about when to return to the ED if needed. PT verbalizes that he understands all instructions and results. Pt informed that ED is open and available 24/7 365 days a yr, encouraged to return to the ED if they have any change in condition, or feel the need for revaluation.

## 2025-06-21 ENCOUNTER — OFFICE (OUTPATIENT)
Dept: URBAN - METROPOLITAN AREA CLINIC 12 | Facility: CLINIC | Age: 30
Setting detail: OPHTHALMOLOGY
End: 2025-06-21
Payer: COMMERCIAL

## 2025-06-21 DIAGNOSIS — B30.9: ICD-10-CM

## 2025-06-21 PROCEDURE — 92012 INTRM OPH EXAM EST PATIENT: CPT | Performed by: OPTOMETRIST

## 2025-06-21 ASSESSMENT — KERATOMETRY
METHOD_AUTO_MANUAL: AUTO
OS_K2POWER_DIOPTERS: 40.00
OS_K1POWER_DIOPTERS: 39.50
OD_AXISANGLE_DEGREES: 091
OD_K1POWER_DIOPTERS: 40.25
OD_K2POWER_DIOPTERS: 41.25
OS_AXISANGLE_DEGREES: 081

## 2025-06-21 ASSESSMENT — REFRACTION_MANIFEST
OD_CYLINDER: -0.50
OD_SPHERE: -1.00
OD_SPHERE: -1.50
OD_CYLINDER: -0.50
OD_VA1: 20/20-
OD_VA1: 20/30
OD_AXIS: 180
OD_AXIS: 050

## 2025-06-21 ASSESSMENT — REFRACTION_CURRENTRX
OD_OVR_VA: 20/
OS_SPHERE: PLANO
OS_VPRISM_DIRECTION: SV
OD_VPRISM_DIRECTION: SV
OD_AXIS: 163
OD_CYLINDER: -1.25
OD_SPHERE: +1.75
OS_AXIS: 0
OS_OVR_VA: 20/
OS_CYLINDER: SPHERE

## 2025-06-21 ASSESSMENT — CORNEAL TRAUMA - ABRASION: OD_ABRASION: ABSENT

## 2025-06-21 ASSESSMENT — REFRACTION_AUTOREFRACTION
OD_SPHERE: +1.25
OD_AXIS: 177
OS_AXIS: 084
OD_CYLINDER: -0.25
OS_SPHERE: +1.00
OS_CYLINDER: -0.25

## 2025-06-21 ASSESSMENT — VISUAL ACUITY
OS_BCVA: 20/25+2
OD_BCVA: 20/20

## 2025-06-21 ASSESSMENT — SUPERFICIAL PUNCTATE KERATITIS (SPK): OD_SPK: T

## 2025-07-28 ENCOUNTER — OFFICE (OUTPATIENT)
Dept: URBAN - METROPOLITAN AREA CLINIC 12 | Facility: CLINIC | Age: 30
Setting detail: OPHTHALMOLOGY
End: 2025-07-28
Payer: COMMERCIAL

## 2025-07-28 DIAGNOSIS — H40.013: ICD-10-CM

## 2025-07-28 PROCEDURE — 99213 OFFICE O/P EST LOW 20 MIN: CPT | Performed by: STUDENT IN AN ORGANIZED HEALTH CARE EDUCATION/TRAINING PROGRAM

## 2025-07-28 PROCEDURE — 92083 EXTENDED VISUAL FIELD XM: CPT | Performed by: STUDENT IN AN ORGANIZED HEALTH CARE EDUCATION/TRAINING PROGRAM

## 2025-07-28 PROCEDURE — 92133 CPTRZD OPH DX IMG PST SGM ON: CPT | Performed by: STUDENT IN AN ORGANIZED HEALTH CARE EDUCATION/TRAINING PROGRAM

## 2025-07-28 ASSESSMENT — KERATOMETRY
OD_K2POWER_DIOPTERS: 41.50
OS_K1POWER_DIOPTERS: 39.50
METHOD_AUTO_MANUAL: AUTO
OS_AXISANGLE_DEGREES: 078
OD_K1POWER_DIOPTERS: 40.50
OD_AXISANGLE_DEGREES: 095
OS_K2POWER_DIOPTERS: 40.00

## 2025-07-28 ASSESSMENT — REFRACTION_MANIFEST
OD_SPHERE: -1.50
OD_VA1: 20/25-3
OD_VA1: 20/30
OD_SPHERE: +1.00
OD_AXIS: 170
OD_CYLINDER: -0.50
OD_CYLINDER: -0.50
OD_AXIS: 050

## 2025-07-28 ASSESSMENT — CORNEAL TRAUMA - ABRASION: OD_ABRASION: ABSENT

## 2025-07-28 ASSESSMENT — REFRACTION_CURRENTRX
OD_AXIS: 163
OS_OVR_VA: 20/
OS_CYLINDER: SPHERE
OS_SPHERE: PLANO
OD_SPHERE: +1.75
OD_CYLINDER: -1.25
OD_OVR_VA: 20/
OS_VPRISM_DIRECTION: SV
OS_AXIS: 0
OD_VPRISM_DIRECTION: SV

## 2025-07-28 ASSESSMENT — REFRACTION_AUTOREFRACTION
OS_CYLINDER: SPH
OD_CYLINDER: -0.50
OD_AXIS: 171
OD_SPHERE: +1.00
OS_SPHERE: +1.00
OS_AXIS: 000

## 2025-07-28 ASSESSMENT — VISUAL ACUITY
OD_BCVA: 20/20
OS_BCVA: 20/40+1

## 2025-07-28 ASSESSMENT — SUPERFICIAL PUNCTATE KERATITIS (SPK): OD_SPK: T

## 2025-07-28 ASSESSMENT — CONFRONTATIONAL VISUAL FIELD TEST (CVF)
OD_FINDINGS: FULL
OS_FINDINGS: FULL

## 2025-07-28 ASSESSMENT — TONOMETRY
OS_IOP_MMHG: 19
OD_IOP_MMHG: 19